# Patient Record
Sex: MALE
[De-identification: names, ages, dates, MRNs, and addresses within clinical notes are randomized per-mention and may not be internally consistent; named-entity substitution may affect disease eponyms.]

---

## 2020-03-22 ENCOUNTER — NURSE TRIAGE (OUTPATIENT)
Dept: OTHER | Facility: CLINIC | Age: 55
End: 2020-03-22

## 2020-03-22 NOTE — TELEPHONE ENCOUNTER
Reason for Disposition   Fever present > 3 days (72 hours)    Protocols used: FEVER-ADULT-AH    Pt is calling with c/o a fever and productive cough for the last 5 days. Fever has been as high as 102. 3. Recommended that pt see PCP within 24 hours. Provided pt with information about 24/7 E-visit. Also provided care advice.

## 2021-09-19 ENCOUNTER — NURSE TRIAGE (OUTPATIENT)
Dept: OTHER | Facility: CLINIC | Age: 56
End: 2021-09-19

## 2021-09-19 NOTE — TELEPHONE ENCOUNTER
Brief description of triage: Wife calling to ask about covid testing for her . He had covid in the past which caused a need for hospitalization and intubation. He has mild symptoms now. Please see triage below for more detailed information. Triage indicates for home care    Care advice provided, patient verbalizes understanding; denies any other questions or concerns; instructed to call back for any new or worsening symptoms. This triage is a result of a call to [address] [place] [address]. Please do not respond to the triage nurse through this encounter. Any subsequent communication should be directly with the patient. Reason for Disposition   COVID-19 Testing, questions about    Answer Assessment - Initial Assessment Questions  1. COVID-19 DIAGNOSIS: \"Who made your Coronavirus (COVID-19) diagnosis? \" \"Was it confirmed by a positive lab test?\" If not diagnosed by a HCP, ask \"Are there lots of cases (community spread) where you live? \" (See public health department website, if unsure)      Was not diagnosed but has slight symptoms with a history of serious Covid infection in the past    2. COVID-19 EXPOSURE: \"Was there any known exposure to COVID before the symptoms began? \" CDC Definition of close contact: within 6 feet (2 meters) for a total of 15 minutes or more over a 24-hour period. No    3. ONSET: \"When did the COVID-19 symptoms start? \"       A coupe days ago    4. WORST SYMPTOM: \"What is your worst symptom? \" (e.g., cough, fever, shortness of breath, muscle aches)      Body aches    5. COUGH: \"Do you have a cough? \" If so, ask: \"How bad is the cough? \"        Slight cough    6. FEVER: \"Do you have a fever? \" If so, ask: \"What is your temperature, how was it measured, and when did it start? \"      Denies    7. RESPIRATORY STATUS: \"Describe your breathing? \" (e.g., shortness of breath, wheezing, unable to speak)       No SOB or wheezing    8.  BETTER-SAME-WORSE: \"Are you getting better, staying the same or getting worse compared to yesterday? \"  If getting worse, ask, \"In what way? \"      Same    9. HIGH RISK DISEASE: \"Do you have any chronic medical problems? \" (e.g., asthma, heart or lung disease, weak immune system, obesity, etc.)      Had Covid 18 months and was intubated x4 days    10. PREGNANCY: \"Is there any chance you are pregnant? \" \"When was your last menstrual period? \"        NA    11. OTHER SYMPTOMS: \"Do you have any other symptoms? \"  (e.g., chills, fatigue, headache, loss of smell or taste, muscle pain, sore throat; new loss of smell or taste especially support the diagnosis of COVID-19)        Body aches and very slight cough    Protocols used: CORONAVIRUS (COVID-19) [facility]ED [facility]PECT[facility]

## 2023-05-23 ENCOUNTER — APPOINTMENT (OUTPATIENT)
Dept: PRIMARY CARE | Facility: CLINIC | Age: 58
End: 2023-05-23
Payer: COMMERCIAL

## 2024-11-07 ENCOUNTER — APPOINTMENT (OUTPATIENT)
Dept: NEUROLOGY | Facility: CLINIC | Age: 59
End: 2024-11-07
Payer: COMMERCIAL

## 2024-11-08 DIAGNOSIS — E04.1 THYROID NODULE: Primary | ICD-10-CM

## 2025-01-20 ENCOUNTER — APPOINTMENT (OUTPATIENT)
Dept: PRIMARY CARE | Facility: CLINIC | Age: 60
End: 2025-01-20
Payer: COMMERCIAL

## 2025-01-20 VITALS
TEMPERATURE: 98 F | SYSTOLIC BLOOD PRESSURE: 142 MMHG | WEIGHT: 234 LBS | BODY MASS INDEX: 32.76 KG/M2 | RESPIRATION RATE: 20 BRPM | HEIGHT: 71 IN | HEART RATE: 96 BPM | DIASTOLIC BLOOD PRESSURE: 78 MMHG

## 2025-01-20 DIAGNOSIS — L40.9 PSORIASIS: Primary | ICD-10-CM

## 2025-01-20 PROBLEM — R06.83 SNORINGS: Status: RESOLVED | Noted: 2025-01-20 | Resolved: 2025-01-20

## 2025-01-20 PROBLEM — N48.6 PEYRONIE'S DISEASE: Status: ACTIVE | Noted: 2025-01-20

## 2025-01-20 PROBLEM — Z86.73 HISTORY OF TIAS: Status: ACTIVE | Noted: 2025-01-20

## 2025-01-20 PROBLEM — N48.89 ACQUIRED CURVATURE OF PENIS: Status: RESOLVED | Noted: 2018-09-05 | Resolved: 2025-01-20

## 2025-01-20 PROBLEM — M25.50 CHRONIC JOINT PAIN: Status: RESOLVED | Noted: 2025-01-20 | Resolved: 2025-01-20

## 2025-01-20 PROBLEM — B35.3 TINEA PEDIS: Status: RESOLVED | Noted: 2025-01-20 | Resolved: 2025-01-20

## 2025-01-20 PROBLEM — G93.32 POST-COVID CHRONIC FATIGUE: Status: RESOLVED | Noted: 2025-01-20 | Resolved: 2025-01-20

## 2025-01-20 PROBLEM — Z86.74 HISTORY OF CARDIAC ARREST: Status: ACTIVE | Noted: 2025-01-20

## 2025-01-20 PROBLEM — R10.9 ABDOMINAL PAIN: Status: RESOLVED | Noted: 2025-01-20 | Resolved: 2025-01-20

## 2025-01-20 PROBLEM — E78.00 HYPERCHOLESTEROLEMIA: Status: ACTIVE | Noted: 2025-01-20

## 2025-01-20 PROBLEM — M54.50 CHRONIC LOW BACK PAIN: Status: ACTIVE | Noted: 2025-01-20

## 2025-01-20 PROBLEM — G89.29 CHRONIC LOW BACK PAIN: Status: ACTIVE | Noted: 2025-01-20

## 2025-01-20 PROBLEM — K44.9 HIATAL HERNIA: Status: RESOLVED | Noted: 2025-01-20 | Resolved: 2025-01-20

## 2025-01-20 PROBLEM — R53.83 LOW ENERGY: Status: RESOLVED | Noted: 2025-01-20 | Resolved: 2025-01-20

## 2025-01-20 PROBLEM — R05.9 COUGH: Status: RESOLVED | Noted: 2025-01-20 | Resolved: 2025-01-20

## 2025-01-20 PROBLEM — I47.19 ATRIAL TACHYCARDIA (CMS-HCC): Status: ACTIVE | Noted: 2025-01-20

## 2025-01-20 PROBLEM — K21.9 GERD (GASTROESOPHAGEAL REFLUX DISEASE): Status: ACTIVE | Noted: 2025-01-20

## 2025-01-20 PROBLEM — R03.0 ELEVATED BLOOD PRESSURE READING: Status: RESOLVED | Noted: 2025-01-20 | Resolved: 2025-01-20

## 2025-01-20 PROBLEM — Z86.16 HISTORY OF 2019 NOVEL CORONAVIRUS DISEASE (COVID-19): Status: RESOLVED | Noted: 2025-01-20 | Resolved: 2025-01-20

## 2025-01-20 PROBLEM — R63.8 CRAVING FOR PARTICULAR FOOD: Status: RESOLVED | Noted: 2025-01-20 | Resolved: 2025-01-20

## 2025-01-20 PROBLEM — M77.30 HEEL SPUR: Status: RESOLVED | Noted: 2025-01-20 | Resolved: 2025-01-20

## 2025-01-20 PROBLEM — M72.2 PLANTAR FASCIITIS: Status: ACTIVE | Noted: 2025-01-20

## 2025-01-20 PROBLEM — E55.9 VITAMIN D DEFICIENCY: Status: ACTIVE | Noted: 2025-01-20

## 2025-01-20 PROBLEM — M79.672 PAIN OF LEFT HEEL: Status: RESOLVED | Noted: 2025-01-20 | Resolved: 2025-01-20

## 2025-01-20 PROBLEM — E66.811 OBESITY, CLASS I, BMI 30-34.9: Status: ACTIVE | Noted: 2024-12-03

## 2025-01-20 PROBLEM — R07.89 ATYPICAL CHEST PAIN: Status: RESOLVED | Noted: 2025-01-20 | Resolved: 2025-01-20

## 2025-01-20 PROBLEM — U09.9 POST-COVID CHRONIC FATIGUE: Status: RESOLVED | Noted: 2025-01-20 | Resolved: 2025-01-20

## 2025-01-20 PROBLEM — G89.29 CHRONIC JOINT PAIN: Status: RESOLVED | Noted: 2025-01-20 | Resolved: 2025-01-20

## 2025-01-20 PROCEDURE — 99212 OFFICE O/P EST SF 10 MIN: CPT | Performed by: FAMILY MEDICINE

## 2025-01-20 RX ORDER — HALOBETASOL PROPIONATE 0.5 MG/G
CREAM TOPICAL 2 TIMES DAILY
Qty: 50 G | Refills: 2 | Status: SHIPPED | OUTPATIENT
Start: 2025-01-20 | End: 2025-02-03

## 2025-01-20 RX ORDER — CARBIDOPA AND LEVODOPA 25; 100 MG/1; MG/1
TABLET ORAL
COMMUNITY
Start: 2024-12-16 | End: 2025-07-05

## 2025-01-20 NOTE — PROGRESS NOTES
"Subjective   Patient ID: Roland Schmidt is a 59 y.o. male who presents for Rash (Dry crusty patches on both hands, Rt foot, and Rt leg. They have been using Clomotrizole. It helps but not all the way and then it comes right back. The one on his foot makes his foot go numb if he touches it. ).    HPI     Review of Systems   Skin:  Positive for rash.       Objective   /78   Pulse 96   Temp 36.7 °C (98 °F)   Resp 20   Ht 1.803 m (5' 11\")   Wt 106 kg (234 lb)   BMI 32.64 kg/m²     Physical Exam  Skin:     Findings: Rash present.             Comments: Thick silvery plaques         Assessment/Plan   Problem List Items Addressed This Visit    None  Visit Diagnoses         Codes    Psoriasis    -  Primary L40.9               "

## 2025-02-05 ENCOUNTER — TELEPHONE (OUTPATIENT)
Dept: PRIMARY CARE | Facility: CLINIC | Age: 60
End: 2025-02-05
Payer: COMMERCIAL

## 2025-02-05 NOTE — TELEPHONE ENCOUNTER
----- Message from Dangelo Mayfield sent at 2/5/2025 12:49 PM EST -----  See message  ----- Message -----  From: Dangelo Mayfield DO  Sent: 2/3/2025  12:00 AM EST  To: Dangelo Mayfield DO      ----- Message -----  From: Dangelo Mayfield DO  Sent: 1/20/2025  11:13 AM EST  To: Dangelo Mayfield DO    How is cream helping with the psoriasis

## 2025-02-20 ENCOUNTER — APPOINTMENT (OUTPATIENT)
Dept: NEUROLOGY | Facility: HOSPITAL | Age: 60
End: 2025-02-20
Payer: COMMERCIAL

## 2025-06-17 ENCOUNTER — APPOINTMENT (OUTPATIENT)
Dept: NEUROLOGY | Facility: CLINIC | Age: 60
End: 2025-06-17
Payer: COMMERCIAL

## 2025-06-17 VITALS
SYSTOLIC BLOOD PRESSURE: 126 MMHG | WEIGHT: 230 LBS | DIASTOLIC BLOOD PRESSURE: 84 MMHG | HEART RATE: 79 BPM | BODY MASS INDEX: 32.2 KG/M2 | HEIGHT: 71 IN

## 2025-06-17 DIAGNOSIS — G20.A1 PARKINSON'S DISEASE WITHOUT DYSKINESIA OR FLUCTUATING MANIFESTATIONS: Primary | ICD-10-CM

## 2025-06-17 PROCEDURE — 3008F BODY MASS INDEX DOCD: CPT | Performed by: STUDENT IN AN ORGANIZED HEALTH CARE EDUCATION/TRAINING PROGRAM

## 2025-06-17 PROCEDURE — 99204 OFFICE O/P NEW MOD 45 MIN: CPT | Performed by: STUDENT IN AN ORGANIZED HEALTH CARE EDUCATION/TRAINING PROGRAM

## 2025-06-17 PROCEDURE — 1036F TOBACCO NON-USER: CPT | Performed by: STUDENT IN AN ORGANIZED HEALTH CARE EDUCATION/TRAINING PROGRAM

## 2025-06-17 RX ORDER — CARBIDOPA AND LEVODOPA 25; 100 MG/1; MG/1
1.5 TABLET ORAL 4 TIMES DAILY
Qty: 540 TABLET | Refills: 2 | Status: SHIPPED | OUTPATIENT
Start: 2025-06-17

## 2025-06-17 ASSESSMENT — UNIFIED PARKINSONS DISEASE RATING SCALE (UPDRS)
AMPLITUDE_RLE: 0
PRONATION_SUPINATION_LEFT: 0
FREEZING_GAIT: 0
AMPLITUDE_LIP_JAW: 0
RIGIDITY_LUE: 0
DYSKINESIAS_PRESENT: NO
AMPLITUDE_LUE: 0
POSTURAL_STABILITY: 0
TOTAL_SCORE: 10
FINGER_TAPPING_LEFT: 0
RIGIDITY_RLE: 1
CHAIR_RISING_SCALE: 0
CLINICAL_STATE: ON
PARKINSONS_MEDS: YES
POSTURAL_TREMOR_RIGHTHAND: 0
RIGIDITY_NECK: 1
SPEECH: 1
CONSTANCY_TREMOR_ATREST: 0
FACIAL_EXPRESSION: 2
SPONTANEITY_OF_MOVEMENT: 1
FINGER_TAPPING_RIGHT: 1
PRONATION_SUPINATION_RIGHT: 1
KINETIC_TREMOR_RIGHTHAND: 0
LEG_AGILITY_RIGHT: 0
GAIT: 0
MINUTES_SINCE_LEVODOPA: 90
AMPLITUDE_LLE: 0
LEVODOPA: YES
LEG_AGILITY_LEFT: 0
POSTURE: 0
RIGIDITY_RUE: 1
RIGIDITY_LLE: 0
TOETAPPING_RIGHT: 0
POSTURAL_TREMOR_LEFTHAND: 0
AMPLITUDE_RUE: 0
HANDMOVEMENTS_RIGHT: 1
KINETIC_TREMOR_LEFTHAND: 0
TOETAPPING_LEFT: 0

## 2025-06-17 ASSESSMENT — PATIENT HEALTH QUESTIONNAIRE - PHQ9
2. FEELING DOWN, DEPRESSED OR HOPELESS: SEVERAL DAYS
SUM OF ALL RESPONSES TO PHQ9 QUESTIONS 1 & 2: 1
1. LITTLE INTEREST OR PLEASURE IN DOING THINGS: NOT AT ALL

## 2025-06-17 NOTE — PATIENT INSTRUCTIONS
Thank you for your visit today. You were seen by Dr. Brown for Parkinson disease. If you have any questions or need to reach me, call my office at [phone].    We discussed the following plan today:   Spread out the Sinemet - 1.5 tabs 4 times a day (every 4 hours upon awakening)  Full glass of water upon awakening  An important part of the treatment is exercise. I recommend 20-30 minutes of moderate to high intensity cardiovascular exercise at least 3 times per week. Stationary bike, recumbent bike, or pool exercise can be considered if you have pain or balance issues. You can also check out the local Parkinson's exercise classes. Mediterranean or MIND diet has also been shown to have benefits in slowing Parkinson disease and other neurologic disease.  https://www.[employer]/nutrition/mind-diet#what-it-is  Return in 6 months

## 2025-06-17 NOTE — PROGRESS NOTES
"Subjective     Roland Schmidt is a right handed  60 y.o. year old male who presents with Parkinson's Disease (NPV, was diagnosed 1 year ago by Dr. Shea at [facility]. Pt c/o some right sided weakness and trembling. Discuss other treatment options, after taking Sinemet pt feels weak and dizziness. Here to establish care w/ our office due to location being closer. Some depression, no current treatment. ). Patient is accompanied by: spouse  Visit type: new patient visit     5 years ago, he was critically ill with severe COVID requiring intubation for several weeks. After coming home he was \"never the same\" - specifically he noticed slowness of his right arm and leg along with micrographia. He has been on treatment for the past year and currently is taking Sinemet 2 tabs TID at 8a, 2p, and 8p. It helps his symptoms quite a bit. It takes around 1 hour to kick in in the morning - during the onset he feels jittery and lightheaded along with generalized weakness. He denies fluctuations in the day time. He drinks around 4 bottles of water a day. He denies balance difficulty.    He completely lost his sense of smell after covid. No RBD symptoms but his wife sleeps in another room due to his snoring. He denies constipation nor urinary difficulty, nor dysphagia. He denies orthostasis. His memory seems to be stable. No symptoms of psychosis. He has mild depression.        Problem List[1]   Medical History[2]   Surgical History[3]   Social History     Socioeconomic History    Marital status:      Spouse name: Not on file    Number of children: Not on file    Years of education: Not on file    Highest education level: Not on file   Occupational History    Not on file   Tobacco Use    Smoking status: Never     Passive exposure: Never    Smokeless tobacco: Never   Vaping Use    Vaping status: Never Used   Substance and Sexual Activity    Alcohol use: Not Currently    Drug use: Never    Sexual activity: Not on file   Other " "Topics Concern    Not on file   Social History Narrative    Not on file     Social Drivers of Health     Financial Resource Strain: Not on file   Food Insecurity: Not on file   Transportation Needs: Not on file   Physical Activity: Not on file   Stress: Not on file   Social Connections: Not on file   Intimate Partner Violence: Not on file   Housing Stability: Not on file      Family History[4]   Patient Health Questionnaire-2 Score: 1          Review of Systems  All other system have been reviewed and are negative for complaint.    Vitals:    06/17/25 0903   BP: 126/84   BP Location: Right arm   Patient Position: Sitting   BP Cuff Size: Adult   Pulse: 79   Weight: 104 kg (230 lb)   Height: 1.803 m (5' 11\")     Objective   Neurological Exam  Mental Status  Awake, alert and oriented to person, place and time. Speech is normal. Language is fluent with no aphasia. Attention and concentration are normal.    Cranial Nerves  CN II: Visual fields full to confrontation.  CN III, IV, VI: Extraocular movements intact bilaterally. Normal saccades. Normal smooth pursuit. Normal lids and orbits bilaterally. Pupils equal round and reactive to light bilaterally.  CN V:  Right: Facial sensation is normal.  Left: Facial sensation is normal on the left.  CN VII: Full and symmetric facial movement.  CN VIII: Hearing is normal.  CN IX, X: Palate elevates symmetrically  CN XI: Shoulder shrug strength is normal.  CN XII: Tongue midline without atrophy or fasciculations.    Motor  Normal muscle bulk throughout. No fasciculations present. No abnormal involuntary movements.                                               Right                     Left   Shoulder abduction               5                          5  Elbow flexion                         5                          5  Elbow extension                    5                          5  Finger flexion                         5                          5  Finger extension               "      5                          5  Finger abduction                    5                          5  Hip flexion                              5                          5  Knee flexion                           5                          5  Plantarflexion                         5                          5  Dorsiflexion                            5                          5  Mild right-sided bradykinesia and rigidity.    Sensory  Light touch is normal in upper and lower extremities. Normal vibration in distal lower extremities.     Reflexes                                            Right                      Left  Biceps                                 2+                         2+  Triceps                                2+                         2+  Patellar                                2+                         2+  Achilles                                0                         0  Right Plantar: downgoing  Left Plantar: downgoing    Right pathological reflexes: Dylon's absent.  Left pathological reflexes: Dylon's absent.    Coordination  Right: Finger-to-nose normal.Left: Finger-to-nose normal.    Gait   Normal tandem gait. Romberg is absent.  Reduced R armswing but otherwise normal gait.        MDS UPDRS 1st Score: Motor Examination  Is the patient on medication for treating the symptoms of Parkinson's Disease?: Yes  Patients receiving medication for treating the symptoms of Parkinson's Disease, argenis the patient's clinical state.: On  Is the patient on Levodopa?: Yes  Minutes since last Levodopa dose: 90  Speech: 1  Facial Expression: 2  Rigidty Neck: 1  Rigidty RUE: 1  Rigidity - LUE: 0  Rigidity RLE: 1  Rigidity LLE: 0  Finger Tapping Right Hand: 1  Finger Tapping Left Hand: 0  Hand Movements- Right Hand: 1  Hand Movements- Left Hand: 0  Pronatiaon-Supination Movments - Right Hand: 1  Pronatiaon-Supination Movments Left Hand: 0  Toe Tapping Right Foot: 0  Toe Tapping - Left Foot: 0  Leg Agility -  Right Le  Leg Agility - Left le  Arising from Chair: 0  Gait: 0  Freezing of Gait: 0  Postural Stability: 0  Posture: 0  Global Spontanteity of Movment ( Body Bradykinesia): 1  Postural Tremor - Right Hand: 0  Postural Tremor - Left hand: 0  Kinetic Tremor - Right hand: 0  Kinetic Tremor - Left hand: 0  Rest Tremor Amplitude - RUE: 0  Rest Tremor Amplitude - LUE: 0  Rest Tremor Amplitude - RLE: 0  Rest Tremor Amplitude - LLE: 0  Rest Tremor Amplitude - Lip/Jaw: 0  Constancy of Rest Tremor: 0  MDS UPDRS Total Score: 10  Were dyskinesias (chorea or dystonia) present during examination?: No                                           Patient Health Questionnaire-2 Score: 1                  Assessment/Plan   Problem List Items Addressed This Visit    None  Visit Diagnoses         Codes      Parkinson's disease without dyskinesia or fluctuating manifestations    -  Primary G20.A1    Relevant Medications    carbidopa-levodopa (Sinemet)  mg tablet            Roland Schmidt is a 60 y.o. year old male here for transfer of care for Parkinson disease since , unmasked by severe covid at the time. He has a great response to Sinemet and appears well treated but will try to spread the medication given mild side effect of dizziness with first dose. If that is not enough I may try adding carbidopa.    We discussed the following plan today:   Spread out the Sinemet - 1.5 tabs 4 times a day (every 4 hours upon awakening)  Full glass of water upon awakening  An important part of the treatment is exercise. I recommend 20-30 minutes of moderate to high intensity cardiovascular exercise at least 3 times per week. Stationary bike, recumbent bike, or pool exercise can be considered if you have pain or balance issues. You can also check out the local Parkinson's exercise classes. Mediterranean or MIND diet has also been shown to have benefits in slowing Parkinson disease and other neurologic  disease.  https://www.[employer]/nutrition/mind-diet#what-it-is  Return in 6 months         [1]   Patient Active Problem List  Diagnosis    Atrial tachycardia    Chronic low back pain    GERD (gastroesophageal reflux disease)    History of cardiac arrest    History of TIAs    Hypercholesterolemia    Obesity, Class I, BMI 30-34.9    Peyronie's disease    Plantar fasciitis    Vitamin D deficiency   [2]   Past Medical History:  Diagnosis Date    Abdominal pain 01/20/2025    Acquired curvature of penis 09/05/2018    Atypical chest pain 01/20/2025    Cough 01/20/2025    Craving for particular food 01/20/2025    Elevated blood pressure reading 01/20/2025    Heel spur 01/20/2025    History of 2019 novel coronavirus disease (COVID-19) 01/20/2025    Low energy 01/20/2025    Pain of left heel 01/20/2025    Snorings 01/20/2025   [3]   Past Surgical History:  Procedure Laterality Date    MR HEAD ANGIO WO IV CONTRAST  1/26/2021    MR HEAD ANGIO WO IV CONTRAST 1/26/2021 [facility]    MR NECK ANGIO WO IV CONTRAST  1/26/2021    MR NECK ANGIO WO IV CONTRAST 1/26/2021 [facility]   [4] No family history on file.

## 2025-12-11 ENCOUNTER — APPOINTMENT (OUTPATIENT)
Dept: NEUROLOGY | Facility: CLINIC | Age: 60
End: 2025-12-11
Payer: COMMERCIAL